# Patient Record
Sex: FEMALE | Race: WHITE
[De-identification: names, ages, dates, MRNs, and addresses within clinical notes are randomized per-mention and may not be internally consistent; named-entity substitution may affect disease eponyms.]

---

## 2017-11-13 ENCOUNTER — HOSPITAL ENCOUNTER (EMERGENCY)
Dept: HOSPITAL 92 - ERS | Age: 82
Discharge: HOME | End: 2017-11-13
Payer: MEDICARE

## 2017-11-13 DIAGNOSIS — J20.9: Primary | ICD-10-CM

## 2017-11-13 DIAGNOSIS — Z79.899: ICD-10-CM

## 2017-11-13 DIAGNOSIS — Z79.82: ICD-10-CM

## 2017-11-13 LAB
ALP SERPL-CCNC: 92 U/L (ref 40–150)
ALT SERPL W P-5'-P-CCNC: (no result) U/L (ref 8–55)
ANION GAP SERPL CALC-SCNC: 14 MMOL/L (ref 10–20)
APTT PPP: 22.1 SEC (ref 22.9–36.1)
AST SERPL-CCNC: 17 U/L (ref 5–34)
BASOPHILS # BLD AUTO: 0.1 THOU/UL (ref 0–0.2)
BASOPHILS NFR BLD AUTO: 1 % (ref 0–1)
BILIRUB SERPL-MCNC: 0.4 MG/DL (ref 0.2–1.2)
BUN SERPL-MCNC: 15 MG/DL (ref 9.8–20.1)
CALCIUM SERPL-MCNC: 9.5 MG/DL (ref 7.8–10.44)
CHLORIDE SERPL-SCNC: 102 MMOL/L (ref 98–107)
CK SERPL-CCNC: 67 U/L (ref 29–168)
CO2 SERPL-SCNC: 25 MMOL/L (ref 23–31)
CREAT CL PREDICTED SERPL C-G-VRATE: 0 ML/MIN (ref 70–130)
EOSINOPHIL # BLD AUTO: 0.3 THOU/UL (ref 0–0.7)
EOSINOPHIL NFR BLD AUTO: 2.5 % (ref 0–10)
GLOBULIN SER CALC-MCNC: 2.5 G/DL (ref 2.4–3.5)
HCT VFR BLD CALC: 40.5 % (ref 36–47)
HYALINE CASTS #/AREA URNS LPF: (no result) LPF
LACTATE SERPL-SCNC: 1.1 MMOL/L (ref 0.5–2.2)
LIPASE SERPL-CCNC: 9 U/L (ref 8–78)
LYMPHOCYTES # BLD: 2 THOU/UL (ref 1.2–3.4)
LYMPHOCYTES NFR BLD AUTO: 18.8 % (ref 21–51)
MONOCYTES # BLD AUTO: 1.1 THOU/UL (ref 0.11–0.59)
MONOCYTES NFR BLD AUTO: 10.1 % (ref 0–10)
NEUTROPHILS # BLD AUTO: 7 THOU/UL (ref 1.4–6.5)
PROTHROMBIN TIME: 12.9 SEC (ref 12–14.7)
RBC # BLD AUTO: 3.83 MILL/UL (ref 4.2–5.4)
RBC UR QL AUTO: (no result) HPF (ref 0–3)
TROPONIN I SERPL DL<=0.01 NG/ML-MCNC: (no result) NG/ML (ref ?–0.03)
WBC # BLD AUTO: 10.4 THOU/UL (ref 4.8–10.8)
WBC UR QL AUTO: (no result) HPF (ref 0–3)

## 2017-11-13 PROCEDURE — 87040 BLOOD CULTURE FOR BACTERIA: CPT

## 2017-11-13 PROCEDURE — 81003 URINALYSIS AUTO W/O SCOPE: CPT

## 2017-11-13 PROCEDURE — 93005 ELECTROCARDIOGRAM TRACING: CPT

## 2017-11-13 PROCEDURE — 85025 COMPLETE CBC W/AUTO DIFF WBC: CPT

## 2017-11-13 PROCEDURE — 96375 TX/PRO/DX INJ NEW DRUG ADDON: CPT

## 2017-11-13 PROCEDURE — 85730 THROMBOPLASTIN TIME PARTIAL: CPT

## 2017-11-13 PROCEDURE — 85610 PROTHROMBIN TIME: CPT

## 2017-11-13 PROCEDURE — 83605 ASSAY OF LACTIC ACID: CPT

## 2017-11-13 PROCEDURE — 96365 THER/PROPH/DIAG IV INF INIT: CPT

## 2017-11-13 PROCEDURE — 82553 CREATINE MB FRACTION: CPT

## 2017-11-13 PROCEDURE — 80053 COMPREHEN METABOLIC PANEL: CPT

## 2017-11-13 PROCEDURE — 96367 TX/PROPH/DG ADDL SEQ IV INF: CPT

## 2017-11-13 PROCEDURE — 81015 MICROSCOPIC EXAM OF URINE: CPT

## 2017-11-13 PROCEDURE — 83880 ASSAY OF NATRIURETIC PEPTIDE: CPT

## 2017-11-13 PROCEDURE — 82550 ASSAY OF CK (CPK): CPT

## 2017-11-13 PROCEDURE — 71010: CPT

## 2017-11-13 PROCEDURE — 84484 ASSAY OF TROPONIN QUANT: CPT

## 2017-11-13 PROCEDURE — 94640 AIRWAY INHALATION TREATMENT: CPT

## 2017-11-13 PROCEDURE — 83690 ASSAY OF LIPASE: CPT

## 2017-11-13 NOTE — RAD
PORTABLE CHEST 1 VIEW:

 

DATE: 11/13/17.

TIME: 10:04 a.m.

 

HISTORY: 

Cough and congestion for 1 week.

 

FINDINGS: 

The heart size is enlarged.  The aorta is tortuous.  The lungs are expanded without focal areas of c
onsolidation, pneumothorax, or pleural effusions.  No karl pulmonary edema is seen.  No significant
 interval change is seen since the exam of 7/10/17.

 

IMPRESSION: 

No acute process.

 

POS: MALACHI

## 2017-12-10 ENCOUNTER — HOSPITAL ENCOUNTER (OUTPATIENT)
Dept: HOSPITAL 92 - ERS | Age: 82
Setting detail: OBSERVATION
LOS: 1 days | Discharge: HOME | End: 2017-12-11
Attending: INTERNAL MEDICINE | Admitting: INTERNAL MEDICINE
Payer: MEDICARE

## 2017-12-10 VITALS — BODY MASS INDEX: 31.8 KG/M2

## 2017-12-10 DIAGNOSIS — E66.9: ICD-10-CM

## 2017-12-10 DIAGNOSIS — I10: ICD-10-CM

## 2017-12-10 DIAGNOSIS — E78.5: ICD-10-CM

## 2017-12-10 DIAGNOSIS — I48.91: ICD-10-CM

## 2017-12-10 DIAGNOSIS — I44.0: ICD-10-CM

## 2017-12-10 DIAGNOSIS — Z88.1: ICD-10-CM

## 2017-12-10 DIAGNOSIS — R54: ICD-10-CM

## 2017-12-10 DIAGNOSIS — I69.354: ICD-10-CM

## 2017-12-10 DIAGNOSIS — R07.89: Primary | ICD-10-CM

## 2017-12-10 DIAGNOSIS — R71.8: ICD-10-CM

## 2017-12-10 DIAGNOSIS — Z90.710: ICD-10-CM

## 2017-12-10 DIAGNOSIS — I71.2: ICD-10-CM

## 2017-12-10 DIAGNOSIS — G20: ICD-10-CM

## 2017-12-10 DIAGNOSIS — M32.9: ICD-10-CM

## 2017-12-10 DIAGNOSIS — E87.6: ICD-10-CM

## 2017-12-10 DIAGNOSIS — F32.9: ICD-10-CM

## 2017-12-10 DIAGNOSIS — Z87.74: ICD-10-CM

## 2017-12-10 DIAGNOSIS — Z98.890: ICD-10-CM

## 2017-12-10 DIAGNOSIS — Z79.82: ICD-10-CM

## 2017-12-10 DIAGNOSIS — Z79.899: ICD-10-CM

## 2017-12-10 LAB
ALP SERPL-CCNC: 99 U/L (ref 40–150)
ALT SERPL W P-5'-P-CCNC: (no result) U/L (ref 8–55)
ANION GAP SERPL CALC-SCNC: 13 MMOL/L (ref 10–20)
APTT PPP: 32 SEC (ref 22.9–36.1)
AST SERPL-CCNC: 18 U/L (ref 5–34)
BASOPHILS # BLD AUTO: 0.1 THOU/UL (ref 0–0.2)
BASOPHILS NFR BLD AUTO: 1 % (ref 0–1)
BILIRUB SERPL-MCNC: 0.2 MG/DL (ref 0.2–1.2)
BUN SERPL-MCNC: 18 MG/DL (ref 9.8–20.1)
CALCIUM SERPL-MCNC: 10 MG/DL (ref 7.8–10.44)
CHLORIDE SERPL-SCNC: 104 MMOL/L (ref 98–107)
CK SERPL-CCNC: 27 U/L (ref 29–168)
CO2 SERPL-SCNC: 24 MMOL/L (ref 23–31)
CREAT CL PREDICTED SERPL C-G-VRATE: 0 ML/MIN (ref 70–130)
EOSINOPHIL # BLD AUTO: 0.2 THOU/UL (ref 0–0.7)
EOSINOPHIL NFR BLD AUTO: 3.4 % (ref 0–10)
GLOBULIN SER CALC-MCNC: 2.5 G/DL (ref 2.4–3.5)
HCT VFR BLD CALC: 45.2 % (ref 36–47)
LIPASE SERPL-CCNC: 9 U/L (ref 8–78)
LYMPHOCYTES # BLD: 1.8 THOU/UL (ref 1.2–3.4)
LYMPHOCYTES NFR BLD AUTO: 30 % (ref 21–51)
MONOCYTES # BLD AUTO: 0.6 THOU/UL (ref 0.11–0.59)
MONOCYTES NFR BLD AUTO: 9.4 % (ref 0–10)
NEUTROPHILS # BLD AUTO: 3.4 THOU/UL (ref 1.4–6.5)
PROTHROMBIN TIME: 13.7 SEC (ref 12–14.7)
RBC # BLD AUTO: 4.26 MILL/UL (ref 4.2–5.4)
TROPONIN I SERPL DL<=0.01 NG/ML-MCNC: (no result) NG/ML (ref ?–0.03)
TROPONIN I SERPL DL<=0.01 NG/ML-MCNC: (no result) NG/ML (ref ?–0.03)
WBC # BLD AUTO: 6.1 THOU/UL (ref 4.8–10.8)

## 2017-12-10 PROCEDURE — 82553 CREATINE MB FRACTION: CPT

## 2017-12-10 PROCEDURE — 99285 EMERGENCY DEPT VISIT HI MDM: CPT

## 2017-12-10 PROCEDURE — 85610 PROTHROMBIN TIME: CPT

## 2017-12-10 PROCEDURE — 93306 TTE W/DOPPLER COMPLETE: CPT

## 2017-12-10 PROCEDURE — 83690 ASSAY OF LIPASE: CPT

## 2017-12-10 PROCEDURE — 83735 ASSAY OF MAGNESIUM: CPT

## 2017-12-10 PROCEDURE — 71275 CT ANGIOGRAPHY CHEST: CPT

## 2017-12-10 PROCEDURE — 82550 ASSAY OF CK (CPK): CPT

## 2017-12-10 PROCEDURE — 84484 ASSAY OF TROPONIN QUANT: CPT

## 2017-12-10 PROCEDURE — 85025 COMPLETE CBC W/AUTO DIFF WBC: CPT

## 2017-12-10 PROCEDURE — 93970 EXTREMITY STUDY: CPT

## 2017-12-10 PROCEDURE — 80053 COMPREHEN METABOLIC PANEL: CPT

## 2017-12-10 PROCEDURE — 36415 COLL VENOUS BLD VENIPUNCTURE: CPT

## 2017-12-10 PROCEDURE — 84132 ASSAY OF SERUM POTASSIUM: CPT

## 2017-12-10 PROCEDURE — G0378 HOSPITAL OBSERVATION PER HR: HCPCS

## 2017-12-10 PROCEDURE — 85379 FIBRIN DEGRADATION QUANT: CPT

## 2017-12-10 PROCEDURE — 85730 THROMBOPLASTIN TIME PARTIAL: CPT

## 2017-12-10 PROCEDURE — 71010: CPT

## 2017-12-10 PROCEDURE — 93005 ELECTROCARDIOGRAM TRACING: CPT

## 2017-12-10 NOTE — RAD
PORTABLE UPRIGHT FRONTAL CHEST:

 

Date:  12/10/17 

 

COMPARISON:  

11/13/17. 

 

HISTORY:  

Chest pain. 

 

FINDINGS:

Heart and mediastinal contours are stable. No pneumothorax, pleural fluid, focal consolidation, or al
veolar edema. There is atherosclerotic calcification of the aortic arch. 

 

IMPRESSION: 

No acute findings.  

 

 

POS: GREGORIO

## 2017-12-11 VITALS — SYSTOLIC BLOOD PRESSURE: 148 MMHG | DIASTOLIC BLOOD PRESSURE: 75 MMHG | TEMPERATURE: 98.1 F

## 2017-12-11 LAB — TROPONIN I SERPL DL<=0.01 NG/ML-MCNC: (no result) NG/ML (ref ?–0.03)

## 2017-12-11 NOTE — CON
CARDIOLOGY CONSULTATION

 

DATE OF SERVICE:  12/11/2017

 

REASON FOR CONSULTATION:  Chest pain.

 

HISTORY OF PRESENT ILLNESS:  Ms. De Los Santos is a very pleasant 82-year-old white female, well known 
to myself who comes into the hospital for chest pain.  She has been dealing with on and off pain for 
about the last 2 weeks.  She had a cold about 3-4 weeks ago that was treated with antibiotics eventua
lly thought that was gotten better; however, she has been coughing up this whitish gel like sputum in
 the last few days.  Because she was complaining of left-sided chest pain under her left breast, she 
was transferred over here for further evaluation and care.  So far, she has ruled out with negative e
nzymes and unremarkable EKG.  She has a history of PFO closure with Amplatzer cribriform occluder dev
ice.  This has been imaged several times since it was placed and looks well placed without issues.  S
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  Since then,
 she developed atrial fibrillation and was started on full anticoagulation.

 

PAST MEDICAL HISTORY:

1.  History of cryptogenic CVAs x2.

2.  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

3.  Compression fractures in the past.

4.  Ascending aortic aneurysm.

5.  Hypertension.

6.  Parkinson disease.

7.  Paroxysmal atrial fibrillation.

8.  Hyperlipidemia.

9.  Systemic lupus erythematosus.

10.  Senile tremors.

 

PAST SURGICAL HISTORY:

1.  Percutaneous PFO closure.

2.  Hysterectomy.

3.  Subtotal thyroidectomy.

4.  Left femoral neck fracture.

 

ALLERGIES:  LEVAQUIN.

 

OUTPATIENT MEDICATIONS:  Include:

1.  Aspirin 81 a day.

2.  Sinemet.

3.  Pristiq.

4.  Diazepam.

5.  Colace.

6.  Aricept.

7.  Lexapro.

8.  Lasix.

9.  Hydrochlorothiazide.

10.  Iron.

11.  Probiotics.

12.  Lisinopril 2.5 mg a day.

13.  Primidone.

14.  Simethicone.

 

SOCIAL HISTORY:  No alcohol, tobacco or drugs.

 

FAMILY HISTORY:  Noncontributory.

 

REVIEW OF SYSTEMS:  A 12-point review of systems was done and is all negative unless stated in the hi
story of present illness.

 

PHYSICAL EXAMINATION:

VITAL SIGNS:  Temperature 98.2, pulse 76, respiration rate 16, satting 94% on room air, blood pressur
e 139/60.

GENERAL:  Awake, alert, oriented x3, in no distress.

HEENT:  Normocephalic, atraumatic.

NECK:  Supple.

LUNGS:  Clear.

CARDIOVASCULAR:  S1, S2, no S3, S4, no murmurs or rubs.

ABDOMEN:  Soft, positive bowel sounds.

EXTREMITIES:  No edema.

SKIN:  Warm and dry.

 

LABORATORY WORK:  Reviewed and unremarkable except for elevated MCV and MCHC.  Coags with D-dimer was
 a little bit high in chemistry.  Her potassium was 3.3.  Troponins have been undetectable x3.  Lipas
e and albumin were normal.

 

EKG was reviewed.

 

Chest x-ray was reviewed, which showed no acute findings.

 

Lower extremity venous ultrasound showed no evidence of DVT.

 

ASSESSMENT AND PLAN:

1.  Chest pain:  Atypical for ischemia, most likely related to her recent episode of cough.  We will 
get an echocardiogram to make sure that otherwise, it looks normal and if it does, she should be able
 to be discharged home later today.

2.  Possible upper respiratory infection with increase in her recent cough and sputum production.  We
 will most likely need an antibiotic on discharge.

3.  Paroxysmal atrial fibrillation.

 

Thank you for letting us participate in the care of your patient.  We will follow.

## 2017-12-11 NOTE — DIS
PRIMARY CARE PHYSICIAN:  Dr. Sunny Gao.

 

DATE OF ADMISSION:  12/10/2017

 

DATE OF DISCHARGE:  12/11/2017

 

DISCHARGE DIAGNOSIS:  Chest pain.

 

CONDITION OF PATIENT ON THE DAY OF DISCHARGE:  Stable.  I assessed Ms. De Los Santos on the day of disc
harge.  She denies any chest pain or shortness of breath.

 

PHYSICAL EXAMINATION:

VITAL SIGNS:  Stable.

HEART:  S1 and S2 are heard, regular.

LUNGS:  Clear to auscultation bilaterally.

 

HOSPITAL COURSE:  Ms. De Los Santos is a pleasant 82-year-old lady, who was admitted to [facility] on December 11, 2017 for atypical chest pain.  She also reports cough with whitish
 sputum.  She was seen by the Cardiology Service, Dr. Lyons.  Her chest pain was felt to be atypical
 for ischemia, most likely related to her recent episode of cough.  She had a 2D echocardiogram, whic
h showed left ventricular ejection fraction of 50%-55%, delayed septal activation consistent with bun
dle-branch block morphology, grade I/III diastolic dysfunction, mild mitral regurgitation, mild aorti
c regurgitation and mild tricuspid regurgitation.  She is being discharged home on cefdinir 300 mg 2 
times a day for 10 days.

 

Her cardiac enzymes were normal during this hospitalization.  There were no arrhythmias on telemetry 
monitoring.

 

DISCHARGE MEDICATIONS:  In addition to her preadmission home medications as dictated on history and p
hysical note from December 10, 2017, she is being discharged home on cefdinir 300 mg 2 times a day fo
r 10 days.

 

Many thanks for allowing me to participate in your patient's care.  Please feel free to contact me wi
th any questions or concerns.

 

DISCHARGE DESTINATION:  Home.

 

ADDENDUM:

Ms. De Los Santos had an elevated D-dimer.  She also underwent CT angiogram of the chest.  Preliminary 
report indicates that there was no pulmonary embolism.  She does appear to have an ascending aortic a
neurysm.  The patient is reportedly aware of this finding.  She is advised to follow up with her [facility] physician for the final report of the test.

## 2017-12-11 NOTE — ULT
DOPPLER VENOUS ULTRASOUND OF BOTH LOWER EXTREMITIES:

 

INDICATION: 

Bilateral lower extremity tenderness.

 

TECHNIQUE:  

Gray scale, color Doppler, and vascular duplex with spectral analysis was performed of the deep venou
s structures of both lower extremities. The common femoral vein, superficial femoral vein, popliteal 
vein, posterior tibial vein, proximal greater saphenous, and proximal profunda veins were assessed bi
laterally.

 

FINDINGS: 

There is normal compression, flow, and augmentation seen within the deep venous structures of both lo
wer extremities.

 

IMPRESSION: 

No evidence of deep vein thrombosis in both lower extremities.

 

POS: OFF

## 2017-12-11 NOTE — CT
CONTRAST ENHANCED CTA CHEST:

 

History: 82-year-old with respiratory distress and ascending aortic aneurysm. 

 

FINDINGS: 

Contrast enhanced CTA of the chest was performed. 2D and 3D reconstruct images performed on an "[employer] 3D work station. 

 

Extensive coronary artery calcification is seen. There is an ascending aortic aneurysm, diameter narayan
uring 4.2 cm. 

 

No evidence of mediastinal or hilar lymphadenopathy is seen. No evidence of filling defects seen in t
he pulmonary arteries to suggest pulmonary emboli. No evidence of pleural or pericardial effusion see
n. 

 

IMPRESSION: 

No evidence of pulmonary emboli. 

 

POS: GREGORIO

## 2017-12-11 NOTE — HP
DATE OF ADMISSION:  12/10/2017

 

The patient was seen and examined on 12/10/2017.

 

CODE STATUS:  FULL CODE.

 

SURROGATE DECISION MAKER:  The patient makes her own decisions with the help of her family.

 

CHIEF COMPLAINT:  Chest discomfort.

 

HISTORY OF PRESENT ILLNESS:  The patient is an 82-year-old female with hypertension, percutaneous PFO
 closure with cribriform plate and ascending aortic aneurysm, who presented to the emergency room wit
h chest discomfort that has been intermittent for the last 1 week or so.  The pain is moderate in int
ensity without any aggravating or relieving factor.  She also had some intermittent cough without sig
nificant production.  No nausea, vomiting, lightheadedness, dizziness, diaphoresis, palpitations, or 
syncope reported.  She also had some pain in the left leg without significant swelling.  No dyspepsia
 or heartburn reported.

 

In the emergency room, initial vital signs showed temperature 97.7, respirations 22, pulse of 100, bl
ood pressure of 150/120 with O2 saturation 96% on room air.  Later on, her blood pressure dropped to 
124/82 in the emergency room.  Her chest x-ray was negative for acute findings.  There was no mediast
inal widening.  Her EKG showed sinus rhythm with first-degree AV block and nonspecific ST-T wave caldwell
ges.  She received aspirin in the emergency room.

 

PAST MEDICAL HISTORY:

1.  History of cerebrovascular accident with residual left-sided deficits.

2.  Percutaneous PFO closure with cribriform plate.

3.  History of compression fractures.

4.  Ascending aortic aneurysm.

5.  Hypertension.

6.  Parkinson disease.

7.  Depression.

8.  History of atrial fibrillation.

9.  Hypertension.

10.  Hyperlipidemia.

11.  History of systemic lupus erythematosus.

12.  Senile tremors.

 

PAST SURGICAL HISTORY:

1.  PFO closure.

2.  Hysterectomy.

3.  Subtotal thyroidectomy.

4.  Surgery for left femoral neck fracture.

 

ALLERGIES:  Patient is allergic to LEVAQUIN.

 

CURRENT HOME MEDICATIONS:  Aspirin 81 mg daily, Sinemet-CR 50/200 mg t.i.d. Pristiq 50 mg daily, jean
epam as needed, Colace 100 mg at bedtime, Aricept 10 mg daily, Lexapro 20 mg daily, Lasix as needed, 
hydrochlorothiazide 12.5 mg daily, iron 65 mg at bedtime, probiotics b.i.d., lisinopril 2.5 mg daily,
 primidone 50 mg at bedtime, simethicone as needed.

 

SOCIAL HISTORY:  Patient currently lives at home with her daughter.  No smoking, alcohol, or drug use
.

 

FAMILY HISTORY:  Negative for premature coronary artery disease.

 

REVIEW OF SYSTEMS:  The following complete review of systems was negative, unless otherwise mentioned
 in the HPI or below:  Constitutional:  Weight loss or gain, ability to conduct usual activities.  Sk
in:  Rash, itching.  Eyes:  Double vision, pain.  ENT/Mouth:  Nose bleeding, neck stiffness, pain, te
nderness.  Cardiovascular:  Palpitations, dyspnea on exertion, orthopnea.  Respiratory:  Shortness of
 breath, wheezing, cough, hemoptysis, fever or night sweats.  Gastrointestinal:  Poor appetite, abdom
inal pain, heartburn, nausea, vomiting, constipation, or diarrhea.  Genitourinary:  Urgency, frequenc
y, dysuria, nocturia.  Musculoskeletal:  Pain, swelling.  Neurologic/Psychiatric:  Anxiety, depressio
n.  Allergy/Immunologic:  Skin rash, bleeding tendency.

 

PHYSICAL EXAMINATION:

VITAL SIGNS:  As discussed above.

GENERAL:  An 82-year-old female in no apparent distress.  Denies any chest discomfort.

HEENT:  Head atraumatic, normocephalic.  Sclerae are anicteric.  Moist mucous membranes.  No oral les
ion.

NECK:  Supple.  No JVD appreciated.  No carotid bruit.

LUNGS:  Clear to auscultation bilaterally.

HEART:  S1, S2 present.  Regular rate and rhythm.  No significant rubs or gallops appreciated.

ABDOMEN:  Soft, bowel sounds present.  No rebound or guarding.  No costovertebral angle tenderness.

EXTREMITIES:  No edema or calf tenderness.

NEUROLOGIC:  At baseline.  No new focal deficits.  There was tremor noted in the left hand.

PSYCHIATRY:  As discussed above.

SKIN:  Warm and dry.

LYMPH NODES:  No palpable lymph nodes in the neck.

PERIPHERAL VASCULAR:  Radial pulses palpable bilaterally.

MUSCULOSKELETAL:  No joint swelling or tenderness.

 

LABORATORY AND X-RAY FINDINGS:  CBC showed WBC 6.1 with hemoglobin 15, hematocrit 45.2, platelet of 2
62.  Chemistries showed sodium 138, potassium 3.3, chloride 104, bicarbonate 24, BUN of 18, creatinin
e 0.6, glucose 135.  Troponins were normal.  Magnesium was 2.3.  EKG and chest x-ray by my review as 
discussed above.

 

IMPRESSION:

1.  Chest discomfort, rule out acute coronary syndrome.

2.  History of atrial fibrillation, currently normal sinus rhythm.

3.  History of cerebrovascular accident with residual deficits.

4.  Parkinson disease.

5.  Senile tremors.

6.  History of patent foramen ovale closure.

7.  Hypertension.

8.  Depression without any suicidal ideation.

9.  Hyperlipidemia.

10.  Hypokalemia.

11.  Obesity with a body mass index of 31.9.

12.  Microcytosis.  Vitamin B12 was normal last year.

 

PLAN:  The patient will be monitored on the telemetry unit.  Cardiology will be consulted.  We will o
btain serial cardiac enzymes.  We will resume her home medications.  We will repeat echocardiogram if
 it was not done recently.  Telemetry monitoring.

 

Plan of care was discussed with the patient.  She stated understanding.  We will resume her home medi
cations.